# Patient Record
Sex: FEMALE | Race: WHITE
[De-identification: names, ages, dates, MRNs, and addresses within clinical notes are randomized per-mention and may not be internally consistent; named-entity substitution may affect disease eponyms.]

---

## 2017-02-02 NOTE — DIAGNOSTIC IMAGING REPORT
RIGHT KNEE 3 VIEWS



CLINICAL HISTORY: RIGHT KNEE PAIN

Right pain



COMPARISON: None.



DISCUSSION: The bones and joint spaces appear intact. There is no evidence of

fracture, dislocation or bony disease. There is no evidence for soft tissue

swelling.



IMPRESSION: Negative study.







Electronically signed by:  Prasanna Oneil M.D.

2/2/2017 2:45 PM



Dictated Date/Time:  2/2/2017 2:45 PM

## 2017-02-08 NOTE — MAMMOGRAPHY REPORT
BREAST MRI OF BOTH BREASTS : 2/7/2017

CLINICAL HISTORY: 61-year-old woman presents for follow-up in the breasts.  She is status post left 
breast lumpectomy and radiation for multifocal breast cancer; surgery was performed December 2015.  





COMPARISON: Comparison is made to exams dated:  11/7/2016 mammogram, 11/20/2015 breast MRI, 5/31/201
6 breast MRI, 11/4/2015 mammogram, 11/4/2015 ultrasound biopsy, and 10/26/2015 ultrasound - [facility].   



TECHNIQUE: Using a 1.5 Camelia magnet and dedicated breast coil, multisequence axial images were obtai
zach through the breasts.  After uneventful IV administration of 8 mL of Gadavist, dynamic multiphase
 contrast-enhanced axial images, and sagittal postcontrast were obtained.  Temporal subtraction axia
l images and 3-D MIP images are provided.  Everything was then reviewed on a 3-D workstation, [employer].



FINDINGS:



Right breast: There is minimal background parenchymal enhancement. 2 nonenhancing circumscribed oval
 and lobulated masses are again identified in the right breast.  The first measures approximately 10
 mm and is located in the 9:30 to 10:00 middle one third of the right breast.  The second is in the 
retroareolar breast measuring 6 mm.  When comparing to prior available MRIs, these haven't stable da
ting back to the 11/20/2015 and are most likely benign.  Another 12 month follow-up exam is recommen
ded to ensure at least 2 years of stability.  No other new suspicious enhancing mass, non-mass enhan
cement or suspicious kinetics is identified within the right breast.  There is no focal skin thicken
ing or nipple retraction.  No right axillary lymphadenopathy.  Note is made of susceptibility artifa
ct in the far superior medial right breast from the patient's Mediport.



Left breast: There is minimal background parenchymal enhancement.  There is evidence of previous lois
atment of the left breast, with mild diffuse skin thickening and trabecular edema.  A surgical cavit
y is seen in the 12:00 and 11:00 axes of the middle and posterior left breast, which is decreasing i
n size compared to the most recent prior MRI compatible with an evolving postsurgical seroma and/or 
hematoma.  There is no evidence of a suspicious enhancing mass, non-mass enhancement or suspicious k
inetics within the left breast.  A few morphologically normal lymph nodes are seen in the far latera
l axillary tail region of the left breast.  These are also stable compared to prior MRIs.  There is 
no suspicious left axillary lymphadenopathy.  

IMPRESSION: ACR-BI-RADS CATEGORY 3: PROBABLY BENIGN 

1.  There are expected/evolving post therapeutic changes in the left breast, without MRI evidence of
 malignancy.

2.  There are two circumscribed lobulated masses within the right breast, in the retroareolar axis a
nd 9:30 to 10:00 axis.  These are stable dating back to 11/20/2015, therefore probably benign.  Aguilar
марина, another 12 month follow-up breast MRI is recommended to ensure longer stability in the right br
east, and also to ensure stability in the left breast status post treatment for breast cancer.  Cont
inuation of interval diagnostic mammography is also recommended.

The patient will receive written notification of the results.  





Yue Villalobos M.D.  

ay/:2/8/2017 07:07:55  



Imaging Technologist: MRI Technologist, [facility]

letter sent: Follow Up Recommended 3  

BI-RADS Code: ACR-BI-RADS Category 3: Probably Benign

## 2017-04-05 ENCOUNTER — HOSPITAL ENCOUNTER (OUTPATIENT)
Dept: HOSPITAL 45 - C.ONC | Age: 62
Discharge: HOME | End: 2017-04-05
Attending: PHYSICIAN ASSISTANT
Payer: COMMERCIAL

## 2017-04-05 VITALS
SYSTOLIC BLOOD PRESSURE: 112 MMHG | DIASTOLIC BLOOD PRESSURE: 57 MMHG | HEART RATE: 88 BPM | OXYGEN SATURATION: 96 % | TEMPERATURE: 97.88 F

## 2017-04-05 DIAGNOSIS — Z92.3: ICD-10-CM

## 2017-04-05 DIAGNOSIS — Z85.3: ICD-10-CM

## 2017-04-05 DIAGNOSIS — Z08: Primary | ICD-10-CM

## 2017-04-05 NOTE — RADIATION ONCOLOGY FOLLOW-UP
Radiation Oncology Follow-Up


Date of Visit


Apr 5, 2017.





Reason For Visit


6 month follow-up





Radiation Completion Date


finished   8-1-2016





Diagnosis





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


Status:  Resolved        Onset Date:  11/4/2015


Histology Subtype:  lobular and ductal


Stage:  l (A  multifocal)


Permanent Comment:  Family history of breast cancer


Abnormal left breast mammogram 


Status post ultrasound-guided biopsy 11/04/2015 revealing infiltrating lobular 

carcinoma


Estrogen receptor positive, progesterone receptor positive, HER-2/yenny positive


Status post partial mastectomy and sentinel lymph node biopsy 12/04/2015


3 separate lesions identified multifocal disease


2 lesions were lobular carcinoma and one was ductal


Oncotype DX evaluation scores 29, 14, and 9


Status post systemic chemotherapy with 6 cycles of TCH


Will continue Herceptin for 1 year


Status post completion of radiation therapy 08/01/2016 received 6240 cGy


  Last Edited By: Francie Maria on Sep 22, 2016 11:52





History of Present Illness


Ms. Weaver is a 60-year-old female with a family history of breast cancer. She 

was followed with screening mammograms


that have been unremarkable. On 07/28/2014 patient underwent bilateral digital 

diagnostic mammogram and targeted


bilateral ultrasound. A focal asymmetry had been seen previously in the middle 

one third of the right breast that was not


felt to be significant. There were stable loosely grouped rounded 

microcalcifications in the medial anterior right breast


which on changed and likely benign. A nodular asymmetry within the left 

superior breast and left lateral breast were


compatible with overlapping fibroglandular tissue. This was felt to be probably 

benign with recommended short-term


follow-up. On 02/24/2015 patient underwent bilateral digital diagnostic 

mammogram and targeted left breast ultrasound.


This showed a hypoechoic solid 8 mm mass in the left breast at 2:30 position 

which correlates with the mammographic


asymmetry. This was thought to possibly represent a fibroadenoma. An ultrasound-

guided core needle biopsy was


recommended. A small 4 mm hypoechoic mass in the left breast at 2 o'clock 

position likely represents a cyst with


recommended aspiration. On 03/12/2015 patient underwent an ultrasound-guided 

biopsy of the left breast. This revealed


a fibroadenoma. Case: -S. And aspiration of the left breast also 

performed on 03/12/2015. The aspirate


confirmed the cystic nature of this lesion and no further workup was 

recommended. Recommended repeat mammogram


in 6 months.


On 10/26/2015 patient underwent unilateral left digital diagnostic mammogram 

and targeted left breast ultrasound. This


revealed a newly visualized spiculated 7 mm mass in the left superior posterior 

breast mammographically. A 7 mm


hypoechoic mass was seen in the left breast 11 o'clock position on ultrasound 

correlating to the spiculated mass. Another


questionable 5 mm mass was seen in the left breast at the 12 o'clock position 

and 2 areas of possible architectural


distortion are also seen in the left breast on spot compression cc views. These 

were felt to be moderate suspicion for


malignancy and given a BI-RADS Category 4 cc with recommended biopsy.


On 11/04/2015 patient underwent an ultrasound-guided biopsy of the left breast 

11 o'clock position. This identified an


infiltrating lobular carcinoma histologic grade 2 of 3. The tumor measured up 

to 0.7 cm a core biopsy. No DCIS or LCIS


was identified. Lymphovascular invasion was identified. Estrogen receptors were 

positive (90%, strong). Progesterone


receptors were positive (90%, strong). HER-2/yenny overexpression was equivocal 2+

) week, 40%, the HER-2/yenny by FISH


analysis was negative. Case: [id number]. The patient was seen by Dr. Shahzad Balderas to discuss the surgical


treatment options. He recommended further evaluation of the breast tissue with 

lateral breast MRIs. These were


performed on 11/20/2015. This also showed an enhancing 7 mm mass in the left 

breast at the 11:00 posterior depth


consistent with the biopsy-proven malignancy. Multiple, at least 4, other 

irregular enhancing masses are noted in the left


upper inner quadrant from the 10 to 12 o'clock position which are highly 

suspicious for multifocal malignancy.


Recommend second look ultrasound and possible ultrasound-guided biopsies of 

these masses in order to determine the


extent of the disease if breast conserving therapy was being considered. In 

enhancing 4 mm focus was noted in the left


breast at the 2 o'clock position which may represent normal background. There 

was no evidence on MRI of malignancy in


the right breast.


The patient after discussion of the treatment options ultimately agreed to 

proceed with a breast conserving therapy.. This


procedure was performed on 12/04/2015. A partial mastectomy of the left breast 

and sentinel node biopsy was performed.


The partial mastectomy specimen confirmed multifocal infiltrative lobular 

carcinoma grade 1. 3 separate lesions were


identified. These were distinct and consistent with multifocal disease. One 

measured 1.0 x 0.7 x 0.5 cm and was an


infiltrative lobular carcinoma. The second measured 0.8 x 0.5 x 0.5 cm and also 

was an infiltrative lobular carcinoma. The


third lesion was 0.4 cm and was an infiltrative ductal carcinoma versus 

lobular. There was no DCIS present. There was


DCIS identified area in the margins were evaluated. The lobular carcinoma was 

present 1 mm from the black inked deep


margin of the specimen. The second lesion was located 0.5 cm from the black 

inked deep margin of the specimen. The


carcinoma field to extend to the actual inked margins. There was no 

lymphovascular or perineural invasion. One sentinel


lymph node was identified and showed no evidence of metastatic disease. 

Evaluations of the foci of carcinomas and


blocks B 4 and B 7 for HER-2/yenny amplification by FISH was performed. These 

results were positive for the B 4 Loc and


negative for the B 7 block. Therefore the lesion measuring 0.8 x 0.5 x 0.5 cm 

representing an infiltrative lobular carcinoma


was positive for estrogen, progesterone and HER-2/yenny protein. Case: [id number].


Patient was seen by Dr. Zamudio for oncologic evaluation of adjuvant therapy. 

Based on the histology he was thinking that


the patient would need anti-hormonal adjuvant therapy however he suggested 

Oncotype analysis of each of these 3


lesions to rule out potentially more aggressive disease. These results are 

pending. The patient was also tested BRCA1


and BRCA2 with the results of this also pending. We were asked to see the 

patient to discuss the potential adjuvant


radiation. It is for this reason the patient was seen in referral.





Oncotype DX testing was performed on all 3 lesions.  These were found to be 29, 

14, and 9.  She was seen by Dr. Zamudio and the decision was to receive 

chemotherapy.  She received 6 cycles of TCH.  These were given every 3 weeks.  

She'll continue on Herceptin for 1 year.  She did develop neuropathy.  She 

feels this is steadily improving and is now minimal.  She had changes of the 

fingernails as well as alopecia.  She did require fluids 2 days after each 

treatment.  With this she had minimal nausea and only one episode of vomiting.  

Bowel habits.  Bladder back to normal.  She doesn't experience any thrush or 

mouth ulcerations.  With the last treatment she stated she had some soreness of 

the throat.  She underwent the bracket testing which was found to be negative 

for one and 2.  She was hospitalized May 16 to the 17th for fever of unknown 

origin.  She had weakness, rigors, shortness of breath.  She didn't have 

neutropenia due to the prophylaxis with Neulasta.  She then returned to our 

office to complete radiation therapy.





She completed radiation therapy 08/01/2016.  She received 6240 cGy.





Interim History


She's been doing well over the past 6 months.  She denies any changes to her 

breast.  She has noted no masses or tenderness and no change of the axilla.  She

's had no swelling of her arm.  She is on Arimidex.  She does feel that she has 

increased his joint pain and discomfort.  She is currently receiving physical 

therapy for discomfort that she is having in her medial knee area bilaterally.  

She did try stopping the medication for one month.  That did not affect her 

discomfort so she did restart the medication.  She is up-to-date on 

mammography.  She continues follow-up with Dr. Balderas.  She recently had her 

port removed 02/16/2017.





Allergies


Coded Allergies:  


     No Known Allergies (Unverified , 5/16/16)





Home Medications


Scheduled


Acetaminophen (Tylenol), 1,000 MG PO prn


Anastrozole (Arimidex), 1 TAB PO DAILY


Aspirin (Aspir-81), 1 TAB PO DAILY


Calcium/Vitamin D (Os-Michael 500 Plus D), 1 TAB PO DAILY


Clonazepam (Klonopin), 1 MG PO DAILY


Multiple Vitamin (Multi-Day Vitamins), 1 TAB PO DAILY


Rosuvastatin Calcium (Crestor), 1 TAB PO DAILY


Sertraline Hcl (Zoloft), 200 MG PO DAILY





Scheduled PRN


Ibuprofen Tab (Advil), 400 MG PO for Pain





Review of Systems


Gastrointestinal:  


   Symptoms:  WNL


Oral:  


   Other Oral Symptoms:  occ difficulty swallowing - takes zantac


Respiratory:  


   Symptoms:  WNL


Urinary:  


   Symptoms:  WNL


Skin:  


   Symptoms:  No Problems


Breast:  


   Right Upper Arm Measurement:  31.0


   Right Mid Arm Measurement:  23.9


   Right Wrist Measurement:  16.4


   Left Upper Arm Measurement:  30.5


   Left Mid Arm Measurement:  22.8


   Left Wrist Measurement:  16.0


   Arm Dominence:  Right


   Patient Cosmetic Evaluation:  Excellent


   Staff Cosmetic Evalaluation:  Excellent





Physical Exam





Vital Signs








  Date Time  Temp Pulse Resp B/P Pulse Ox O2 Delivery O2 Flow Rate FiO2


 


4/5/17 14:38 36.6 88 20 112/57 96   








Pain:  


   Side:  Bilateral


   Patient Pain Scale:  0 - 10


   Initial Pain Intensity:  0.0


Fatigue:  None


General Appearance:  no apparent distress


Eyes:  normal inspection, EOMI


ENT:  normal ENT inspection, hearing grossly normal


Neck:  no adenopathy, thyroid normal


Respiratory/Chest:  lungs clear, no respiratory distress, no accessory muscle 

use


Breast:


She has well-healed incisions of the left breast.  There are no masses or 

tenderness no axillary adenopathy.  She has no edema.  There is mild area of 

deficit in the upper inner quadrant of the breast.  She has no telangiectasia.  

She has no nipple changes.  Using the Emma score cosmesis she has a good 

outcome.  The right breast showed no masses or tenderness and no axillary 

adenopathy.


Cardiovascular:  regular rate, rhythm, no gallop, no murmur


Extremities:  no pedal edema


Neurologic/Psychiatric:  no motor/sensory deficits, alert, normal mood/affect


Skin:  warm/dry





Laboratory Studies











Test


  1/20/17


08:12


 


White Blood Count


  7.61 K/uL


(4.8-10.8)


 


Red Blood Count


  4.75 M/uL


(4.2-5.4)


 


Hemoglobin


  14.1 g/dL


(12.0-16.0)


 


Hematocrit 40.1 % (37-47) 


 


Mean Corpuscular Volume


  84.4 fL


()


 


Mean Corpuscular Hemoglobin


  29.7 pg


(25-34)


 


Mean Corpuscular Hemoglobin


Concent 35.2 g/dl


(32-36)


 


Platelet Count


  126 K/uL


(130-400)


 


Mean Platelet Volume


  10.4 fL


(7.4-10.4)


 


Neutrophils (%) (Auto) 75.2 % 


 


Lymphocytes (%) (Auto) 17.6 % 


 


Monocytes (%) (Auto) 5.0 % 


 


Eosinophils (%) (Auto) 2.0 % 


 


Basophils (%) (Auto) 0.1 % 


 


Neutrophils # (Auto)


  5.72 K/uL


(1.4-6.5)


 


Lymphocytes # (Auto)


  1.34 K/uL


(1.2-3.4)


 


Monocytes # (Auto)


  0.38 K/uL


(0.11-0.59)


 


Eosinophils # (Auto)


  0.15 K/uL


(0-0.5)


 


Basophils # (Auto)


  0.01 K/uL


(0-0.2)


 


RDW Standard Deviation


  40.4 fL


(36.4-46.3)


 


RDW Coefficient of Variation


  13.2 %


(11.5-14.5)


 


Immature Granulocyte % (Auto) 0.1 % 


 


Immature Granulocyte # (Auto)


  0.01 K/uL


(0.00-0.02)


 


Sodium Level


  143 mmol/L


(136-145)


 


Potassium Level


  3.9 mmol/L


(3.5-5.1)


 


Chloride Level


  108 mmol/L


()


 


Carbon Dioxide Level


  25 mmol/L


(21-32)


 


Anion Gap


  10.0 mmol/L


(3-11)


 


Blood Urea Nitrogen


  13 mg/dl


(7-18)


 


Creatinine


  0.94 mg/dl


(0.60-1.20)


 


Est Creatinine Clear Calc


Drug Dose 66.8 ml/min 


 


 


Estimated GFR (


American) 76.4 


 


 


Estimated GFR (Non-


American 65.9 


 


 


BUN/Creatinine Ratio 13.7 (10-20) 


 


Random Glucose


  84 mg/dl


(70-99)


 


Calcium Level


  9.5 mg/dl


(8.5-10.1)


 


Magnesium Level


  2.1 mg/dl


(1.8-2.4)


 


Total Bilirubin


  0.2 mg/dl


(0.2-1)


 


Aspartate Amino Transferase


(AST) 24 U/L (15-37) 


 


 


Alanine Aminotransferase (ALT) 36 U/L (12-78) 


 


Alkaline Phosphatase


  98 U/L


()


 


Lactate Dehydrogenase


  189 U/L


()


 


Total Protein


  6.9 gm/dl


(6.4-8.2)


 


Albumin


  3.6 gm/dl


(3.4-5.0)


 


Globulin


  3.3 gm/dl


(2.5-4.0)


 


Albumin/Globulin Ratio 1.1 (0.9-2) 











Assessment & Plan


Plan: Continue regular follow-up with Dr. Balderas, Dr. Zamudio, her primary 

care physician.  She continues follow-up with Dr. Judd.  She is scheduled for 

her next mammogram 05/08/2017.  We'll continue imaging as recommended by 

radiology.  We asked her to return to our office in 1 year.  She may call if 

she has any questions or concerns in the interim.





Total Time In Follow-Up


I spent 20 minutes speaking to the patient and performing examination.  I spent 

15 minutes reviewing information in completing this note.





Copy To


Ga Gaitan MD; Shahzad Balderas M.D.; Parish Zamudio D.O.; Naresh Bukr M.D.

## 2017-05-08 ENCOUNTER — HOSPITAL ENCOUNTER (OUTPATIENT)
Dept: HOSPITAL 45 - C.MAMM | Age: 62
Discharge: HOME | End: 2017-05-08
Attending: PHYSICIAN ASSISTANT
Payer: COMMERCIAL

## 2017-05-08 DIAGNOSIS — N63: Primary | ICD-10-CM

## 2017-05-08 DIAGNOSIS — Z85.3: ICD-10-CM

## 2017-05-16 ENCOUNTER — HOSPITAL ENCOUNTER (OUTPATIENT)
Dept: HOSPITAL 45 - C.MAMM | Age: 62
Discharge: HOME | End: 2017-05-16
Attending: PHYSICIAN ASSISTANT
Payer: COMMERCIAL

## 2017-05-16 DIAGNOSIS — D24.2: Primary | ICD-10-CM

## 2017-05-16 NOTE — MAMMOGRAPHY REPORT
ULTRASOUND GUIDED BIOPSY LEFT BREAST: 5/16/2017

CLINICAL HISTORY: Indeterminate lobulated hypoechoic mass in the 12:00 left breast, 4 cm from the ni
pple.  Personal history of left breast cancer status post lobectomy and radiation.  Patient presents
 for ultrasound guided core needle biopsy.  



COMPARISON: Comparison is made to exams dated:  5/8/2017 ultrasound, 5/8/2017 mammogram, 11/7/2016 m
ammogram, 11/4/2015 mammogram, 10/26/2015 ultrasound, and 10/26/2015 mammogram - [facility]. 



PATIENT CONSENT: The procedure, risks and benefits were discussed with the patient and informed writ
ten consent was obtained. Specific risks to this procedure include: bleeding, infection, puncture of
 adjacent structure, nontarget biopsy, sampling error, metal allergy and medication reaction.



PROCEDURE DESCRIPTION: A time out was performed and the left breast was agreed as the site of biopsy
. The skin was prepped and draped in the usual sterile fashion. The solid lobulated mass in the 12:0
0 left breast was chosen as the target for biopsy. Subcutaneous and intraparenchymal 1% buffered lid
ocaine was administered as local anesthesia. A skin incision was made.  Through the incision, 5 samp
les were taken with a 14 gauge Achieve biopsy device. A wing-shaped metallic marker was placed at th
e biopsy site. Hemostasis was achieved after manual compression. The patient tolerated the procedure
 well and there was no immediate complication.  The samples were sent to the pathology department in
 an appropriately labeled container.



Postprocedure left CC and ML tomosynthesis images were obtained.  There is a new wing-shaped metalli
c biopsy marker in the 12:00 to 1:00 middle one third of the left breast, at the site of the biopsie
d hypoechoic mass seen on ultrasound.  Also noted is a stable ribbon shaped metallic biopsy marker i
n the lateral left breast and surgical clips in the far posterior 12:00 breast.



IMPRESSION: ULTRASOUND GUIDED BIOPSY

Status post ultrasound guided core needle biopsy of an indeterminate solid appearing hypoechoic mass
 in the 12:00 to 1:00 left breast, with wing-shaped biopsy marker placed at the site.



The patient will receive notification of the biopsy results from the referring physician.





Yue Villalobos M.D.  

ay/:5/16/2017 12:51:12  



Imaging Technologist: Karissa HURLEY(R)(M), [facility]

## 2017-05-16 NOTE — DISCHARGE INSTRUCTIONS
Discharge Instructions


Procedure


Procedure Date:


May 16, 2017.


Reason for visit:


Left Mass.





Discharge


Discharge Date:


May 16, 2017.


Discharge Diagnosis:


post left breast ultrasound guided core biopsy





Medications


Restart Stopped Medication(s):


May restart Aspirin today





Instructions


Activity Recommendations:  Additional Limitations (see below)


Return to School/Work:  no limitations


Recommended Home Diet:  No Limitations


Provider Instructions:





ACTIVITY RECOMMENDATIONS:





*  No lifting, pushing, pulling or exercising the affected side for three days.








RETURN TO SCHOOL/WORK:





*  You may return to work/school after the procedure, but do not perform any 

strenuous


   activities for 24 to 48 hours.








MEDICATIONS:





*  Tylenol (two 325 mg) every four to six hours if needed for mild pain (if not 

allergic to Tylenol).








DIET:





*  Resume previous diet.








SPECIAL CARE INSTRUCTIONS:





*  Keep biopsy site dry for 24 hours.  May shower after 24 hours, but do not 

soak (bathe)


   incision.





*  May remove Tegaderm (plastic patch) tomorrow AFTER showering.





*  Leave the steri-strips on for one week.  Allow the steri-strips to fall off 

by themselves.  


   If not off after one week, you may remove them.  You may place a Bandaid


   crosswise over the strips, if desired.





*  Apply ice 10 minutes on and 10 minutes off as needed.





*  Wear a bra at bedtime to sleep more comfortably for 2-3 days.





*  Your referring physician should have the results after approximately 5 to 7 

business days.





*  Call for unusual bleeding, fever, drainage, etc or if you have any questions 

call


    [phone] during normal business hours or after hours call Dr Villalobos, 

[phone].








FOLLOW UP VISIT:





Follow-up with Referring Physician as scheduled.





Allergies


Coded Allergies:  


     No Known Allergies (Unverified , 5/16/16)


Anabella Ravi Recommendations:


 


Call your doctor if:


*  Temperature above 101 degrees


*  Pain not relieved by pain medicine ordered


*  There is increased drainage or redness from any incision


*  You have any unanswered questions or concerns.





Your Doctors Instructions noted above were prepared by provider Yue Villalobos.


Patient Signature Section:


 Patient Instructions Signature Page








Jennifer Weaver 











Patient (or Guardian) Signature/Date:____________________________________ I 

have read and understand the instructions given to me by my caregivers.








Caregiver/RN/Doctor Signature/Date:____________________________________








The above-named patient and/or guardian has received patient instructions on 

this date.


























+  Original Patient Signature Page (only) stays with chart.  Please make copy 

for patient.

## 2017-05-16 NOTE — MAMMOGRAPHY REPORT
UNILATERAL LEFT DIGITAL DIAGNOSTIC MAMMOGRAM TOMOSYNTHESIS: 5/16/2017

CLINICAL HISTORY: Status post ultrasound guided core needle biopsy of a hypoechoic mass in the 12:00
 left breast.  Personal history of left breast cancer status post breast conservation therapy.  



Please refer to the report from left breast ultrasound guided core biopsy performed at the same time
 for full detail.





IMPRESSION:  POST PROCEDURE IMAGING FOR MARKER PLACEMENT

Please refer to the report from left breast ultrasound guided core biopsy performed at the same time
 for full detail.



Approximately 10% of breast cancers are not detected with mammography. A negative mammographic repor
t should not delay biopsy if a clinically suggestive mass is present.



Yue Villalobos M.D.          

ay/:5/16/2017 12:48:45  



Imaging Technologist: Karissa EDWARDS)([email or web address]), [facility]



BI-RADS Code: Post Procedure Imaging For Marker Placement

## 2017-06-06 ENCOUNTER — HOSPITAL ENCOUNTER (OUTPATIENT)
Dept: HOSPITAL 45 - C.RAD1850 | Age: 62
Discharge: HOME | End: 2017-06-06
Attending: NURSE PRACTITIONER
Payer: COMMERCIAL

## 2017-06-06 DIAGNOSIS — R07.9: Primary | ICD-10-CM
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
## 2017-06-06 NOTE — DIAGNOSTIC IMAGING REPORT
CHEST 2 VIEWS ROUTINE



CLINICAL HISTORY: ACCIDENTAL FALL, CHEST PAIN    



COMPARISON STUDY:  Chest radiograph May 16, 2016.



FINDINGS: No pneumothorax or pleural effusion is present. There is no evidence

of pulmonary edema. Borderline cardiomegaly is noted. There is mild lingular

opacity. Right lung is clear. Calcified gallstone is noted within the

gallbladder. There are post surgical findings with the left breast.



IMPRESSION:  



1. Mild lingular opacity. This is nonspecific and differential considerations

include atelectasis, a small area of pneumonia and postradiation change.

Radiographic follow up to ensure resolution is recommended.



2. No pneumothorax. 









Electronically signed by:  Darryl Nunn M.D.

6/6/2017 1:46 PM



Dictated Date/Time:  6/6/2017 1:43 PM

## 2017-11-09 ENCOUNTER — HOSPITAL ENCOUNTER (OUTPATIENT)
Dept: HOSPITAL 45 - C.MAMM | Age: 62
Discharge: HOME | End: 2017-11-09
Attending: SURGERY
Payer: COMMERCIAL

## 2017-11-09 DIAGNOSIS — Z98.890: ICD-10-CM

## 2017-11-09 DIAGNOSIS — Z85.3: Primary | ICD-10-CM

## 2017-11-09 NOTE — MAMMOGRAPHY REPORT
BILATERAL DIGITAL DIAGNOSTIC MAMMOGRAM TOMOSYNTHESIS WITH CAD: 11/9/2017

CLINICAL HISTORY: History of left breast cancer diagnosed November 2015 status post lumpectomy and ra
diation therapy as well as chemotherapy.  Also with recent ultrasound guided core needle biopsy of a 
left 12:00 breast mass May 2017 which yielded benign pathology.  The patient reports no current breas
t complaints.  





TECHNIQUE:  Breast tomosynthesis in addition to standard 2D mammography was performed. Current study 
was also evaluated with a Computer Aided Detection (CAD) system.  Bilateral CC and MLO 2-D and tomosy
nthesis images and spot magnification left CC and ML views were obtained.



COMPARISON: Comparison is made to exams dated:  5/8/2017 mammogram, 5/16/2017 mammogram, 11/7/2016 ma
mmogram, 2/24/2015 mammogram, 7/28/2014 mammogram, and 7/10/2014 mammogram - [facility]
nter.   



BREAST COMPOSITION:  The tissue of both breasts is heterogeneously dense, which may obscure small mas
ses.  



FINDINGS:  There are stable postoperative changes in the left superior breast from prior lumpectomy, 
including stable density, architectural distortion, and surgical clips at the lumpectomy bed.  A line
ar scar marker overlies the left 12:00 breast.  Spot magnification views of the lumpectomy bed demons
trate no suspicious masses or clusters of microcalcifications.  



The remainder of both breasts are stable compared to prior exams, without suspicious masses, calcific
ations, or areas of architectural distortion noted.  2 biopsy marker clips are again noted within the
 left upper outer quadrant from prior benign biopsies.  Scattered bilateral benign-appearing calcific
ations are not significantly changed.  Ovoid 8 mm asymmetry within the right inferior breast middle d
epth on the MLO view is stable compared to prior exams including the 2010 and 2015 exams, and conside
red benign given long-term stability.





IMPRESSION:  ACR BI-RADS CATEGORY 2: BENIGN

Stable posttreatment changes in the left breast, without mammographic evidence of malignancy in eithe
r breast.  Recommend routine bilateral tomosynthesis mammograms in one year; I would recommend the pa
tient remain a diagnostic patient in case additional views or ultrasound need to be performed.  Addit
ionally, the patient is due for follow-up MRI February 2018.  



The patient has been verbally notified of the results.  





Approximately 10% of breast cancers are not detected with mammography. A negative mammographic report
 should not delay biopsy if a clinically suggestive mass is present.



Judy Hurt M.D.          

ah/:11/9/2017 10:20:26  



Imaging Technologist: Krishan HURLEY(ROD)([id number]), [facility]

letter sent: Normal 1/2  

BI-RADS Code: ACR BI-RADS Category 2: Benign

## 2017-12-01 ENCOUNTER — HOSPITAL ENCOUNTER (OUTPATIENT)
Dept: HOSPITAL 45 - C.RAD | Age: 62
Discharge: HOME | End: 2017-12-01
Attending: NURSE PRACTITIONER
Payer: COMMERCIAL

## 2017-12-01 DIAGNOSIS — R91.8: ICD-10-CM

## 2017-12-01 DIAGNOSIS — C50.812: Primary | ICD-10-CM

## 2017-12-01 NOTE — DIAGNOSTIC IMAGING REPORT
CHEST 2 VIEWS ROUTINE



CLINICAL HISTORY: FEMALE BREAST CANCER R50.812 breast carcinoma



COMPARISON STUDY:  6/6/2017



FINDINGS: Previous described left lung opacity is diminished. Nevertheless,

there is a residual density of the left midlung measuring 11 x 9 mm. Lungs

otherwise are clear. Diaphragms are smooth. Costophrenic angles are sharp.

Probable gallstone the right upper quadrant. 



IMPRESSION:  

1. Residual nodular density left midlung measuring 11 by 9 mm.

2. CT chest is recommended as follow-up. 











The above report was generated using voice recognition software.  It may contain

grammatical, syntax or spelling errors.









Electronically signed by:  Prasanna Oneil M.D.

12/1/2017 12:11 PM



Dictated Date/Time:  12/1/2017 12:09 PM

## 2017-12-14 ENCOUNTER — HOSPITAL ENCOUNTER (OUTPATIENT)
Dept: HOSPITAL 45 - C.CTS | Age: 62
Discharge: HOME | End: 2017-12-14
Attending: NURSE PRACTITIONER
Payer: COMMERCIAL

## 2017-12-14 DIAGNOSIS — C50.812: Primary | ICD-10-CM

## 2017-12-14 NOTE — DIAGNOSTIC IMAGING REPORT
(CHEST) THORAX WITH



CT DOSE: 262.78 mGy.cm



HISTORY: Breast carcinoma  FEMALE BREAST CA



TECHNIQUE: Multiaxial CT images of the chest were performed following the

intravenous administration of contrast.  A dose lowering technique was utilized

adhering to the principles of ALARA.





COMPARISON:  Chest series 12/1/2017



FINDINGS: Postoperative and posttherapeutic change to the left breast and left

anterior chest wall. Findings of focal pleural thickening and interstitial

change left midlung. Transaxial image 23 suggests a 9 mm nodular density versus

focal extension of the post therapeutic fibrotic change. No additional

nodularity is identified. Post therapeutic pleural thickening and interstitial

change is noted. No significant mediastinal or hilar adenopathy is present. The

lungs otherwise appear clear. Limited evaluation the upper abdomen demonstrates

a gallstone within the gallbladder lumen. Mild fatty infiltration of liver is

present.



IMPRESSION: 



1. Postoperative and post therapy change to the left breast and chest wall

region as well as the left upper lobe.





2. Focal nodular extension of the fibrotic change versus a free standing nodular

density measuring 9 mm of the left midlung region.

3. This statistically is suggestive of post therapeutic change, although a

repeat CT study in 6 months is suggested to confirm stability .

4. Alternatively, a PET scan could be considered to exclude a metabolically

active lesion.

5. Gallstone.





6. Fatty infiltration of liver.









The above report was generated using voice recognition software.  It may contain

grammatical, syntax or spelling errors.







Electronically signed by:  Prasanna Oneil M.D.

12/14/2017 11:13 AM



Dictated Date/Time:  12/14/2017 11:06 AM

## 2018-02-12 ENCOUNTER — HOSPITAL ENCOUNTER (OUTPATIENT)
Dept: HOSPITAL 45 - C.MRI | Age: 63
Discharge: HOME | End: 2018-02-12
Attending: INTERNAL MEDICINE
Payer: COMMERCIAL

## 2018-02-12 DIAGNOSIS — C50.512: Primary | ICD-10-CM

## 2018-02-13 NOTE — MAMMOGRAPHY REPORT
BREAST MRI OF BOTH BREASTS : 2/12/2018

CLINICAL HISTORY: 62-year-old woman with a personal history of multifocal left breast cancer status p
ost breast conservation treatment presents for additional surveillance and to follow-up probably francisco j aldridge findings in the right breast seen on prior breast MRI.  





COMPARISON: Comparison is made to exams dated:  5/16/2017 mammogram, 11/9/2017 mammogram, 5/8/2017 ma
mmogram, 2/7/2017 breast MRI, 11/20/2015 breast MRI, and 5/31/2016 breast MRI - [facility].   



TECHNIQUE: Using a 1.5 Camelia magnet and dedicated breast coil, multisequence axial images were obtain
ed through the breasts.  After uneventful IV administration of 8 mL of Gadavist, dynamic multiphase c
ontrast-enhanced axial images, and sagittal postcontrast were obtained.  Temporal subtraction axial i
mages and 3-D MIP images are provided.  Everything was then reviewed on a 3-D workstation, [employer].



FINDINGS:



Right breast: There is no significant background parenchymal enhancement.  Circumscribed nonenhancing
 oval masses are again identified in the right breast at approximately 9:30 to 10:00 middle one third
 of the breast and retroareolar middle one third of the breast, measuring 9.5 and 6.3 mm, respectivel
y.  These have not significantly changed in size or appearance dating back to at least 11/20/2015, an
d are therefore considered benign.  No new suspicious enhancing masses, non-mass enhancement, archite
ctural distortion or suspicious kinetics identified in the right breast.  No focal skin thickening or
 nipple retraction.  The retromammary fat is intact.  No suspicious right axillary lymphadenopathy.



Left breast: There is no significant background parenchymal enhancement.  There is expected 
ural distortion and a small, 2.6 cm, residual post surgical seroma and/or hematoma at the surgical si
te in the approximate 12:00 middle and posterior left breast, at the site of prior lumpectomy.  A sma
ll round focus of susceptibility artifact is seen in the upper outer middle one third of the left carisa
ast, at the site of prior benign biopsy which yielded a fibroadenoma.  No new suspicious enhancing ma
sses, non-mass enhancement, unexpected architectural distortion or suspicious kinetics identified in 
the left breast.  No new focal skin thickening or nipple retraction.  No suspicious left axillary lym
phadenopathy.



Incidental note is made of cholelithiasis and slight dilation of the gallbladder, without definite ev
idence of acute cholecystitis.



IMPRESSION: ACR BI-RADS CATEGORY 2: BENIGN 

1.  Stable postsurgical and post biopsy changes in the left breast, without MRI evidence of malignanc
y.

2.  Stable benign-appearing circumscribed nonenhancing masses in the right breast, that are unchanged
 comparing to a prior MRI dating back to 2015, therefore considered benign.  No MRI evidence of malig
marian in the right breast.

3.  Recommend continuation of annual screening mammography schedule and additional yearly surveillanc
e with breast MRI as well, given the personal history of multifocal left breast cancer and dense janeen
sts.

4.  Incidental note is made of cholelithiasis.



The patient will receive written notification of the results.  





Yue Villalobos M.D.  

ay/:2/12/2018 16:58:26  



Imaging Technologist: MRI Technologist, [facility]

letter sent: Normal 1/2  

BI-RADS Code: ACR BI-RADS Category 2: Benign

## 2018-03-20 ENCOUNTER — HOSPITAL ENCOUNTER (OUTPATIENT)
Dept: HOSPITAL 45 - C.CTS | Age: 63
Discharge: HOME | End: 2018-03-20
Attending: INTERNAL MEDICINE
Payer: COMMERCIAL

## 2018-03-20 DIAGNOSIS — C50.812: Primary | ICD-10-CM

## 2018-03-20 LAB
ALBUMIN SERPL-MCNC: 3.9 GM/DL (ref 3.4–5)
ALP SERPL-CCNC: 85 U/L (ref 45–117)
ALT SERPL-CCNC: 22 U/L (ref 12–78)
AST SERPL-CCNC: 15 U/L (ref 15–37)
BASOPHILS # BLD: 0.01 K/UL (ref 0–0.2)
BASOPHILS NFR BLD: 0.2 %
BUN SERPL-MCNC: 10 MG/DL (ref 7–18)
CALCIUM SERPL-MCNC: 9.4 MG/DL (ref 8.5–10.1)
CO2 SERPL-SCNC: 27 MMOL/L (ref 21–32)
CREAT SERPL-MCNC: 0.81 MG/DL (ref 0.6–1.2)
EOS ABS #: 0.12 K/UL (ref 0–0.5)
EOSINOPHIL NFR BLD AUTO: 137 K/UL (ref 130–400)
GLUCOSE SERPL-MCNC: 98 MG/DL (ref 70–99)
HCT VFR BLD CALC: 42.2 % (ref 37–47)
HGB BLD-MCNC: 14.7 G/DL (ref 12–16)
IG#: 0.01 K/UL (ref 0–0.02)
IMM GRANULOCYTES NFR BLD AUTO: 26.4 %
LYMPHOCYTES # BLD: 1.65 K/UL (ref 1.2–3.4)
MCH RBC QN AUTO: 29.5 PG (ref 25–34)
MCHC RBC AUTO-ENTMCNC: 34.8 G/DL (ref 32–36)
MCV RBC AUTO: 84.6 FL (ref 80–100)
MONO ABS #: 0.39 K/UL (ref 0.11–0.59)
MONOCYTES NFR BLD: 6.2 %
NEUT ABS #: 4.08 K/UL (ref 1.4–6.5)
NEUTROPHILS # BLD AUTO: 1.9 %
NEUTROPHILS NFR BLD AUTO: 65.1 %
PMV BLD AUTO: 10.7 FL (ref 7.4–10.4)
POTASSIUM SERPL-SCNC: 3.9 MMOL/L (ref 3.5–5.1)
PROT SERPL-MCNC: 7.2 GM/DL (ref 6.4–8.2)
RED CELL DISTRIBUTION WIDTH CV: 13.3 % (ref 11.5–14.5)
RED CELL DISTRIBUTION WIDTH SD: 40.5 FL (ref 36.4–46.3)
SODIUM SERPL-SCNC: 139 MMOL/L (ref 136–145)
WBC # BLD AUTO: 6.26 K/UL (ref 4.8–10.8)

## 2018-03-20 NOTE — DIAGNOSTIC IMAGING REPORT
CT (CHEST) THORAX WITH



CT DOSE: 330.68 mGy.cm



HISTORY: Breast carcinoma  BREAST CA



TECHNIQUE: Multiaxial CT images of the chest were performed following the

intravenous administration of contrast.  A dose lowering technique was utilized

adhering to the principles of ALARA.





COMPARISON:  12/14/2017



FINDINGS: Stable postoperative changes to the left upper lung and left anterior

chest wall. Unchanging pleural based parenchymal scarring with associated slight

nodularity. The nodular component most likely represents a localized extension

of the post therapeutic scar with no change in maximum diameter at 7.6 mm.



Slight interstitial prominence compared to the prior exam. No focal infiltrate.



No significant hilar or mediastinal adenopathy.



IMPRESSION: 



1. Stable appearance to left anterior chest wall as well as the post therapeutic

parenchymal scarring and underlying nodularity of the left upper lobe.

2. This is increased likelihood of post therapeutic change rather than true

nodularity.

3. Slight nonspecific increase in interstitial markings throughout both

hemithoraces. 

4. Follow-up scan in 6-12 months or as required clinically is recommended.









The above report was generated using voice recognition software.  It may contain

grammatical, syntax or spelling errors.







Electronically signed by:  Prasanna Oneil M.D.

3/20/2018 8:16 AM



Dictated Date/Time:  3/20/2018 8:08 AM

## 2018-04-26 ENCOUNTER — HOSPITAL ENCOUNTER (OUTPATIENT)
Dept: HOSPITAL 45 - C.ONC | Age: 63
Discharge: HOME | End: 2018-04-26
Attending: PHYSICIAN ASSISTANT
Payer: COMMERCIAL

## 2018-04-26 VITALS
DIASTOLIC BLOOD PRESSURE: 70 MMHG | TEMPERATURE: 97.52 F | OXYGEN SATURATION: 96 % | HEART RATE: 72 BPM | SYSTOLIC BLOOD PRESSURE: 124 MMHG

## 2018-04-26 DIAGNOSIS — Z08: Primary | ICD-10-CM

## 2018-04-26 DIAGNOSIS — Z85.3: ICD-10-CM

## 2018-04-26 DIAGNOSIS — Z92.3: ICD-10-CM

## 2018-04-26 NOTE — RADIATION ONCOLOGY FOLLOW-UP
Radiation Oncology Follow-Up


Date of Visit


Apr 26, 2018.





Reason For Visit


Annual follow-up





Radiation Completion Date


8/1/16





Diagnosis





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


Status:  Resolved        Onset Date:  11/4/2015


Histology Subtype:  Lobular and ductal


Stage:  l


Permanent Comment:  Family history of breast cancer


Abnormal left breast mammogram 


Status post ultrasound-guided biopsy 11/04/2015 revealing infiltrating lobular 

carcinoma


Estrogen receptor positive, progesterone receptor positive, HER-2/yenny positive


Status post partial mastectomy and sentinel lymph node biopsy 12/04/2015


3 separate lesions identified multifocal disease


2 lesions were lobular carcinoma and one was ductal


Oncotype DX evaluation scores 29, 14, and 9


Status post systemic chemotherapy with 6 cycles of TCH


Will continue Herceptin for 1 year


Status post completion of radiation therapy 08/01/2016 received 6240 cGy


  Last Edited By: Francie Maria on Sep 22, 2016 11:52





History of Present Illness


Ms. Weaver has with a family history of breast cancer. She was followed with 

screening mammograms


that have been unremarkable. On 07/28/2014 patient underwent bilateral digital 

diagnostic mammogram and targeted


bilateral ultrasound. A focal asymmetry had been seen previously in the middle 

one third of the right breast that was not


felt to be significant. There were stable loosely grouped rounded 

microcalcifications in the medial anterior right breast


which on changed and likely benign. A nodular asymmetry within the left 

superior breast and left lateral breast were


compatible with overlapping fibroglandular tissue. This was felt to be probably 

benign with recommended short-term


follow-up. On 02/24/2015 patient underwent bilateral digital diagnostic 

mammogram and targeted left breast ultrasound.


This showed a hypoechoic solid 8 mm mass in the left breast at 2:30 position 

which correlates with the mammographic


asymmetry. This was thought to possibly represent a fibroadenoma. An ultrasound-

guided core needle biopsy was


recommended. A small 4 mm hypoechoic mass in the left breast at 2 o'clock 

position likely represents a cyst with


recommended aspiration. On 03/12/2015 patient underwent an ultrasound-guided 

biopsy of the left breast. This revealed


a fibroadenoma. Case: -S. And aspiration of the left breast also 

performed on 03/12/2015. The aspirate


confirmed the cystic nature of this lesion and no further workup was 

recommended. Recommended repeat mammogram


in 6 months.


On 10/26/2015 patient underwent unilateral left digital diagnostic mammogram 

and targeted left breast ultrasound. This


revealed a newly visualized spiculated 7 mm mass in the left superior posterior 

breast mammographically. A 7 mm


hypoechoic mass was seen in the left breast 11 o'clock position on ultrasound 

correlating to the spiculated mass. Another


questionable 5 mm mass was seen in the left breast at the 12 o'clock position 

and 2 areas of possible architectural


distortion are also seen in the left breast on spot compression cc views. These 

were felt to be moderate suspicion for


malignancy and given a BI-RADS Category 4 cc with recommended biopsy.


On 11/04/2015 patient underwent an ultrasound-guided biopsy of the left breast 

11 o'clock position. This identified an


infiltrating lobular carcinoma histologic grade 2 of 3. The tumor measured up 

to 0.7 cm a core biopsy. No DCIS or LCIS


was identified. Lymphovascular invasion was identified. Estrogen receptors were 

positive (90%, strong). Progesterone


receptors were positive (90%, strong). HER-2/yenny overexpression was equivocal 2+

) week, 40%, the HER-2/yenny by FISH


analysis was negative. Case: [id number]. The patient was seen by Dr. Shahzad Balderas to discuss the surgical


treatment options. He recommended further evaluation of the breast tissue with 

lateral breast MRIs. These were


performed on 11/20/2015. This also showed an enhancing 7 mm mass in the left 

breast at the 11:00 posterior depth


consistent with the biopsy-proven malignancy. Multiple, at least 4, other 

irregular enhancing masses are noted in the left


upper inner quadrant from the 10 to 12 o'clock position which are highly 

suspicious for multifocal malignancy.


Recommend second look ultrasound and possible ultrasound-guided biopsies of 

these masses in order to determine the


extent of the disease if breast conserving therapy was being considered. In 

enhancing 4 mm focus was noted in the left


breast at the 2 o'clock position which may represent normal background. There 

was no evidence on MRI of malignancy in


the right breast.


The patient after discussion of the treatment options ultimately agreed to 

proceed with a breast conserving therapy.. This


procedure was performed on 12/04/2015. A partial mastectomy of the left breast 

and sentinel node biopsy was performed.


The partial mastectomy specimen confirmed multifocal infiltrative lobular 

carcinoma grade 1. 3 separate lesions were


identified. These were distinct and consistent with multifocal disease. One 

measured 1.0 x 0.7 x 0.5 cm and was an


infiltrative lobular carcinoma. The second measured 0.8 x 0.5 x 0.5 cm and also 

was an infiltrative lobular carcinoma. The


third lesion was 0.4 cm and was an infiltrative ductal carcinoma versus 

lobular. There was no DCIS present. There was


DCIS identified area in the margins were evaluated. The lobular carcinoma was 

present 1 mm from the black inked deep


margin of the specimen. The second lesion was located 0.5 cm from the black 

inked deep margin of the specimen. The


carcinoma field to extend to the actual inked margins. There was no 

lymphovascular or perineural invasion. One sentinel


lymph node was identified and showed no evidence of metastatic disease. 

Evaluations of the foci of carcinomas and


blocks B 4 and B 7 for HER-2/yenny amplification by FISH was performed. These 

results were positive for the B 4 Loc and


negative for the B 7 block. Therefore the lesion measuring 0.8 x 0.5 x 0.5 cm 

representing an infiltrative lobular carcinoma


was positive for estrogen, progesterone and HER-2/yenny protein. Case: [id number].


Patient was seen by Dr. Zamudio for oncologic evaluation of adjuvant therapy. 

Based on the histology he was thinking that


the patient would need anti-hormonal adjuvant therapy however he suggested 

Oncotype analysis of each of these 3


lesions to rule out potentially more aggressive disease. These results are 

pending. The patient was also tested BRCA1


and BRCA2 with the results of this also pending. We were asked to see the 

patient to discuss the potential adjuvant


radiation. It is for this reason the patient was seen in referral.





Oncotype DX testing was performed on all 3 lesions.  These were found to be 29, 

14, and 9.  She was seen by Dr. Zamudio and the decision was to receive 

chemotherapy.  She received 6 cycles of TCH.  These were given every 3 weeks.  

She'll continue on Herceptin for 1 year.  She did develop neuropathy.  She 

feels this is steadily improving and is now minimal.  She had changes of the 

fingernails as well as alopecia.  She did require fluids 2 days after each 

treatment.  With this she had minimal nausea and only one episode of vomiting.  

Bowel habits.  Bladder back to normal.  She doesn't experience any thrush or 

mouth ulcerations.  With the last treatment she stated she had some soreness of 

the throat.  She underwent the bracket testing which was found to be negative 

for one and 2.  She was hospitalized May 16 to the 17th for fever of unknown 

origin.  She had weakness, rigors, shortness of breath.  She didn't have 

neutropenia due to the prophylaxis with Neulasta.  She then returned to our 

office to complete radiation therapy.





She completed radiation therapy 08/01/2016.  She received 6240 cGy.





Interim History


She has been doing well over this past year.  She denies any changes to her 

breast.  She has noted no masses or tenderness and no change of axilla.  She 

had no swelling of her arm.  She is up-to-date on mammography as well as MRIs.  

As part of her screening follow-up she had a chest CT.  There have been some 

abnormalities and these are being followed.  She had a recheck chest CT March 20 , 2018.  This showed a stable appearance to the left chest wall as well as the 

post therapeutic parenchymal scarring and underlying nodularity of the left 

upper lobe.  This is increased likelihood of post-therapeutic change rather 

than true nodularity.  Slight nonspecific increase in interstitial markings 

throughout both santiago-thoraces.  Follow-up scan in 6-12 months or as required 

clinically is recommended.





Allergies


Coded Allergies:  


     No Known Allergies (Unverified , 5/16/16)





Home Medications


Scheduled


Acetaminophen (Tylenol), 1,000 MG PO prn


Aspirin (Aspir-81), 1 TAB PO DAILY


Calcium/Vitamin D (Os-Michael 500 Plus D), 1 TAB PO DAILY


Clonazepam (Klonopin), 1 MG PO DAILY


Exemestane (Aromasin), 25 MG PO DAILY


Multiple Vitamin (Multi-Day Vitamins), 1 TAB PO DAILY


Omeprazole (Prilosec), 20 MG PO DAILY


Rosuvastatin Calcium (Crestor), 1 TAB PO DAILY


Sertraline Hcl (Zoloft), 200 MG PO DAILY





Scheduled PRN


Ibuprofen Tab (Advil), 400 MG PO for Pain





Review of Systems


Gastrointestinal:  


   Symptoms:  WNL


Oral:  


   Symptoms:  No Problems


   Other Oral Symptoms:  "Always had trouble swallowing"


Respiratory:  


   Symptoms:  WNL


Urinary:  


   Symptoms:  WNL


Skin:  


   Symptoms:  No Problems


Breast:  


   Right Upper Arm Measurement:  29.8


   Right Mid Arm Measurement:  23.0


   Right Wrist Measurement:  16.1


   Left Upper Arm Measurement:  31.0


   Left Mid Arm Measurement:  23.2


   Left Wrist Measurement:  16.1


   Arm Dominence:  Right





Physical Exam





Vital Signs








  Date Time  Temp Pulse Resp B/P (MAP) Pulse Ox O2 Delivery O2 Flow Rate FiO2


 


4/26/18 14:14 36.4 72 16 124/70 96   








Fatigue:  None


General Appearance:  no apparent distress


Eyes:  normal inspection, EOMI


ENT:  normal ENT inspection, hearing grossly normal


Neck:  no adenopathy, thyroid normal


Respiratory/Chest:  lungs clear, no respiratory distress, no accessory muscle 

use


Breast:


Breast examination reveals well-healed incisions of the left breast.  There are 

no masses or tenderness and no axillary adenopathy.  There are no skin 

retractions or nipple changes.  Using the [place] score cosmesis she has a good 

outcome.  The right breast showed no masses or tenderness and no axillary 

adenopathy.


Cardiovascular:  regular rate, rhythm, no gallop, no murmur


Extremities:  no pedal edema


Neurologic/Psychiatric:  no motor/sensory deficits, alert, normal mood/affect


Skin:  warm/dry





Pain Management


Patient Reports Pain:  No


Initial Pain Intensity:  0.0


Pain Management Plan


She denies pain currently therefore requires no pain management.





Laboratory


Laboratory Results:  not applicable





Pathology


Pathology Results:  were reviewed, and pertinent findings noted in HPI





Imaging


Imaging Studies:  were reviewed, and pertinent findings noted below


Imaging Comments


BREAST MRI OF BOTH BREASTS : 2/12/2018


CLINICAL HISTORY: 62-year-old woman with a personal history of multifocal left 

breast cancer status post breast conservation treatment presents for additional 

surveillance and to follow-up probably benign findings in the right breast seen 

on prior breast MRI.  








COMPARISON: Comparison is made to exams dated:  5/16/2017 mammogram, 11/9/2017 

mammogram, 5/8/2017 mammogram, 2/7/2017 breast MRI, 11/20/2015 breast MRI, and 5 /31/2016 breast MRI - [facility].   





TECHNIQUE: Using a 1.5 Camelia magnet and dedicated breast coil, multisequence 

axial images were obtained through the breasts.  After uneventful IV 

administration of 8 mL of Gadavist, dynamic multiphase contrast-enhanced axial 

images, and sagittal postcontrast were obtained.  Temporal subtraction axial 

images and 3-D MIP images are provided.  Everything was then reviewed on a 3-D 

workstation, [employer].





FINDINGS:





Right breast: There is no significant background parenchymal enhancement.  

Circumscribed nonenhancing oval masses are again identified in the right breast 

at approximately 9:30 to 10:00 middle one third of the breast and retroareolar 

middle one third of the breast, measuring 9.5 and 6.3 mm, respectively.  These 

have not significantly changed in size or appearance dating back to at least 11/ 20/2015, and are therefore considered benign.  No new suspicious enhancing 

masses, non-mass enhancement, architectural distortion or suspicious kinetics 

identified in the right breast.  No focal skin thickening or nipple retraction.

  The retromammary fat is intact.  No suspicious right axillary lymphadenopathy.





Left breast: There is no significant background parenchymal enhancement.  There 

is expected architectural distortion and a small, 2.6 cm, residual post 

surgical seroma and/or hematoma at the surgical site in the approximate 12:00 

middle and posterior left breast, at the site of prior lumpectomy.  A small 

round focus of susceptibility artifact is seen in the upper outer middle one 

third of the left breast, at the site of prior benign biopsy which yielded a 

fibroadenoma.  No new suspicious enhancing masses, non-mass enhancement, 

unexpected architectural distortion or suspicious kinetics identified in the 

left breast.  No new focal skin thickening or nipple retraction.  No suspicious 

left axillary lymphadenopathy.





Incidental note is made of cholelithiasis and slight dilation of the gallbladder

, without definite evidence of acute cholecystitis.





IMPRESSION: ACR BI-RADS CATEGORY 2: BENIGN 


1.  Stable postsurgical and post biopsy changes in the left breast, without MRI 

evidence of malignancy.


2.  Stable benign-appearing circumscribed nonenhancing masses in the right 

breast, that are unchanged comparing to a prior MRI dating back to 2015, 

therefore considered benign.  No MRI evidence of malignancy in the right breast.


3.  Recommend continuation of annual screening mammography schedule and 

additional yearly surveillance with breast MRI as well, given the personal 

history of multifocal left breast cancer and dense breasts.


4.  Incidental note is made of cholelithiasis.





The patient will receive written notification of the results.  








Yue Villalobos M.D.  


ay/:2/12/2018 16:58:26  





Imaging Technologist: MRI Technologist, [facility]


letter sent: Normal 1/2  


BI-RADS Code: ACR BI-RADS Category 2: Benign


__________________ ________________


Dictated by: Yue Villalobos MD


Signed by: Yue Villalobos MD





CT (CHEST) THORAX WITH





CT DOSE: 330.68 mGy.cm





HISTORY: Breast carcinoma  BREAST CA





TECHNIQUE: Multiaxial CT images of the chest were performed following the


intravenous administration of contrast.  A dose lowering technique was utilized


adhering to the principles of ALARA.








COMPARISON:  12/14/2017





FINDINGS: Stable postoperative changes to the left upper lung and left anterior


chest wall. Unchanging pleural based parenchymal scarring with associated slight


nodularity. The nodular component most likely represents a localized extension


of the post therapeutic scar with no change in maximum diameter at 7.6 mm.





Slight interstitial prominence compared to the prior exam. No focal infiltrate.





No significant hilar or mediastinal adenopathy.





IMPRESSION: 





1. Stable appearance to left anterior chest wall as well as the post therapeutic


parenchymal scarring and underlying nodularity of the left upper lobe.


2. This is increased likelihood of post therapeutic change rather than true


nodularity.


3. Slight nonspecific increase in interstitial markings throughout both


hemithoraces. 


4. Follow-up scan in 6-12 months or as required clinically is recommended.














The above report was generated using voice recognition software.  It may contain


grammatical, syntax or spelling errors.











Electronically signed by:  Prasanna Oneil M.D.


3/20/2018 8:16 AM





Dictated Date/Time:  3/20/2018 8:08 AM





Assessment & Plan


Plan: Continue with annual mammography and MRIs as scheduled.  She should 

continue with recheck CT scans of the chest.  That will be planned in June.  

These will likely be scheduled following her visit at medical oncologist 

office.  There had been a discussion that the areas of nodularity on the CT of 

the chest could be related to radiation.  These images were reviewed today by 

Dr. Garcia.  It is possible that these could be related to the treatment.  She 

should still continue with having a recheck CT of the chest in June.  We asked 

her to return to our office in 1 year.  She may call if she has any questions 

or concerns in the interim.





Total Time


In Follow-Up


I spent 20 minutes speaking to the patient in performing examination.  I spent 

20 minutes reviewing information, reviewing the images with the physician, and 

completing this note.





Copy To


Ga Gaitan MD; Parish Zamudio D.O.





Problem Qualifiers





(1) Breast cancer of upper-inner quadrant of left female breast:  


Estrogen receptor status:  positive  Qualified Codes:  C50.212 - Malignant 

neoplasm of upper-inner quadrant of left female breast; Z17.0 - Estrogen 

receptor positive status [ER+]

## 2019-03-19 NOTE — MAMMOGRAPHY REPORT
UNILATERAL LEFT DIGITAL DIAGNOSTIC MAMMOGRAM TOMOSYNTHESIS WITH CAD AND TARGETED LEFT ULTRASOUND: 5/
8/2017

CLINICAL HISTORY: 61-year-old woman with a personal history of left breast cancer diagnosed in Novem
ber 2015, status post breast conservation therapy.  She presents for further follow-up in the left [facility]  





TECHNIQUE: Left CC and MLO to the digital and tomosynthesis images, spot magnification left CC and M
L views were obtained.  Current study was also evaluated with a Computer Aided Detection (CAD) lolis denton  



COMPARISON: Comparison is made to exams dated:  2/7/2017 breast MRI, 11/7/2016 mammogram, 5/31/2016 
breast MRI, 11/20/2015 breast MRI, 11/4/2015 mammogram, and 11/4/2015 ultrasound biopsy - [facility].   



BREAST COMPOSITION:  The tissue of the left breast is heterogeneously dense, which may obscure small
 masses.  



FINDINGS:  There is persistent density, expected architectural distortion and surgical clips in the 
12:00 posterior left breast, at the site of prior lumpectomy.  2 linear scar markers are identified 
overlying the 12:00 left breast and left axilla.  There is a stable ribbon shaped metallic biopsy ma
rker in the upper outer middle one third of the left breast, denoting the site of prior benign biops
y.  On the left MLO view, projecting over the pectoralis muscle, there are 2 circumscribed masses.  
The larger, more posterior measures 5.2 mm, and the smaller anterior and superior mass measures 3.6 
mm.  These could represent lymph nodes but further evaluation with ultrasound was performed.  No oth
er new mass, focal area of architectural distortion or suspicious microcalcifications are seen.  The
re are scattered benign-appearing round and punctate microcalcifications in the visualized left janeen
st.



Targeted ultrasound was performed throughout the superior left breast.  A few morphologically normal
 lymph nodes are seen in the 3:00 left breast, 9 cm from the nipple and 2:00 left breast, 9 cm from 
the nipple.  These could represent the lymph nodes seen projecting over the pectoralis muscle on the
 MLO view.  In the 2:00 left breast, 5 cm from the nipple, a rounded solid hypoechoic mass with inte
rnal biopsy marker is identified, measuring 6.1 x 5.8 x 7.9 mm.  Based on a prior ultrasound dated 0
2/24/2015, this measured 7.7 x 5.1 x 7.8 mm and is considered stable.  There is an angular hypoechoi
c lesion in the 12:00 left breast, 4 cm from the nipple, measuring 6.0 x 2.8 x 4.3 mm.  This is inde
terminate, warranting definitive characterization with tissue sampling.  An evolving post surgical s
eroma and/or hematoma is identified in the 10:00 left breast along the skin surgical scar.  This is 
benign.



IMPRESSION:  ACR BI-RADS CATEGORY 4: SUSPICIOUS, TARGETED ULTRASOUND ACR BI-RADS CATEGORY 4: SUSPICI
OUS 

1.  Ultrasound guided core needle biopsy is recommended for an indeterminate angular 6 mm hypoechoic
 mass in the 12:00 left breast, 4 cm from the nipple.

2.  Small subcentimeter circumscribed masses projecting over the left pectoralis muscle on the MLO v
iew are thought to correlate with morphologically normal intramammary lymph nodes seen in the 2:00 a
nd 3:00 axes of the left breast on ultrasound.



These results and recommendations were discussed with the patient at the time of the exam.  She tent
atively scheduled the left breast biopsy prior to leaving our department.



Approximately 10% of breast cancers are not detected with mammography. A negative mammographic repor
t should not delay biopsy if a clinically suggestive mass is present.



Yue Villalobos M.D.          

ay/:5/8/2017 12:04:52  



Imaging Technologist: Courtney Watson, [facility]

letter sent: Abnormal 4/5  

BI-RADS Code: ACR BI-RADS Category 4: Suspicious  Ultrasound BI-RADS: ACR BI-RADS Category 4: Suspic
ious with patient